# Patient Record
Sex: FEMALE | Race: BLACK OR AFRICAN AMERICAN | NOT HISPANIC OR LATINO | URBAN - NONMETROPOLITAN AREA
[De-identification: names, ages, dates, MRNs, and addresses within clinical notes are randomized per-mention and may not be internally consistent; named-entity substitution may affect disease eponyms.]

---

## 2023-12-19 ENCOUNTER — ESTABLISHED PATIENT (OUTPATIENT)
Dept: URBAN - NONMETROPOLITAN AREA CLINIC 6 | Facility: CLINIC | Age: 78
End: 2023-12-19

## 2023-12-19 DIAGNOSIS — H40.013: ICD-10-CM

## 2023-12-19 DIAGNOSIS — E11.9: ICD-10-CM

## 2023-12-19 PROCEDURE — 92014 COMPRE OPH EXAM EST PT 1/>: CPT

## 2023-12-19 PROCEDURE — 92133 CPTRZD OPH DX IMG PST SGM ON: CPT

## 2023-12-19 ASSESSMENT — KERATOMETRY
OS_AXISANGLE2_DEGREES: 86
OD_AXISANGLE2_DEGREES: 84
OS_K2POWER_DIOPTERS: 44.75
OD_K1POWER_DIOPTERS: 43.75
OS_K2POWER_DIOPTERS: 45.00
OS_K1POWER_DIOPTERS: 43.25
OD_AXISANGLE2_DEGREES: 75
OS_AXISANGLE_DEGREES: 006
OD_AXISANGLE_DEGREES: 174
OS_AXISANGLE_DEGREES: 176
OD_AXISANGLE_DEGREES: 165
OD_K2POWER_DIOPTERS: 45.25
OD_K2POWER_DIOPTERS: 45.75
OS_AXISANGLE2_DEGREES: 96

## 2023-12-19 ASSESSMENT — VISUAL ACUITY
OU_CC: 20/25+1
OS_CC: 20/20
OD_CC: 20/50

## 2023-12-19 ASSESSMENT — TONOMETRY
OS_IOP_MMHG: 13
OD_IOP_MMHG: 12

## 2024-01-24 ENCOUNTER — TECH ONLY (OUTPATIENT)
Dept: URBAN - NONMETROPOLITAN AREA CLINIC 6 | Facility: CLINIC | Age: 79
End: 2024-01-24

## 2024-01-24 DIAGNOSIS — H40.013: ICD-10-CM

## 2024-01-24 PROCEDURE — 76514 ECHO EXAM OF EYE THICKNESS: CPT

## 2024-01-24 PROCEDURE — 92250 FUNDUS PHOTOGRAPHY W/I&R: CPT

## 2024-01-24 PROCEDURE — 92083 EXTENDED VISUAL FIELD XM: CPT

## 2024-01-24 ASSESSMENT — KERATOMETRY
OD_K1POWER_DIOPTERS: 43.75
OS_AXISANGLE_DEGREES: 176
OS_K2POWER_DIOPTERS: 45.00
OS_AXISANGLE2_DEGREES: 86
OS_K2POWER_DIOPTERS: 44.75
OD_AXISANGLE2_DEGREES: 84
OS_K1POWER_DIOPTERS: 43.25
OD_AXISANGLE_DEGREES: 165
OD_K2POWER_DIOPTERS: 45.25
OS_AXISANGLE2_DEGREES: 96
OS_AXISANGLE_DEGREES: 006
OD_AXISANGLE_DEGREES: 174
OD_K2POWER_DIOPTERS: 45.75
OD_AXISANGLE2_DEGREES: 75

## 2024-01-24 ASSESSMENT — TONOMETRY
OS_IOP_MMHG: 13
OD_IOP_MMHG: 14

## 2024-12-09 ENCOUNTER — TELEPHONE ENCOUNTER (OUTPATIENT)
Dept: URBAN - METROPOLITAN AREA CLINIC 107 | Facility: CLINIC | Age: 79
End: 2024-12-09

## 2025-01-02 PROBLEM — 236069009: Status: ACTIVE | Noted: 2025-01-02

## 2025-01-02 PROBLEM — 162106006: Status: ACTIVE | Noted: 2025-01-02

## 2025-01-02 PROBLEM — 225593006: Status: ACTIVE | Noted: 2025-01-02

## 2025-01-03 ENCOUNTER — OFFICE VISIT (OUTPATIENT)
Dept: URBAN - METROPOLITAN AREA CLINIC 72 | Facility: CLINIC | Age: 80
End: 2025-01-03
Payer: MEDICARE

## 2025-01-03 ENCOUNTER — DASHBOARD ENCOUNTERS (OUTPATIENT)
Age: 80
End: 2025-01-03

## 2025-01-03 VITALS
DIASTOLIC BLOOD PRESSURE: 66 MMHG | BODY MASS INDEX: 31.19 KG/M2 | HEART RATE: 96 BPM | TEMPERATURE: 97.2 F | HEIGHT: 65 IN | SYSTOLIC BLOOD PRESSURE: 104 MMHG | WEIGHT: 187.2 LBS

## 2025-01-03 DIAGNOSIS — R19.7 OVERFLOW DIARRHEA: ICD-10-CM

## 2025-01-03 DIAGNOSIS — K59.09 CHRONIC CONSTIPATION: ICD-10-CM

## 2025-01-03 DIAGNOSIS — R15.1 FECAL SMEARING: ICD-10-CM

## 2025-01-03 PROCEDURE — 99204 OFFICE O/P NEW MOD 45 MIN: CPT

## 2025-01-03 PROCEDURE — 99203 OFFICE O/P NEW LOW 30 MIN: CPT

## 2025-01-03 RX ORDER — BENZONATATE 100 MG/1
1 CAPSULE AS NEEDED CAPSULE ORAL THREE TIMES A DAY
Status: ACTIVE | COMMUNITY

## 2025-01-03 RX ORDER — CARVEDILOL 6.25 MG/1
1 TABLET WITH FOOD TABLET, FILM COATED ORAL TWICE A DAY
Status: ACTIVE | COMMUNITY

## 2025-01-03 RX ORDER — HYDROXYZINE HYDROCHLORIDE 10 MG/1
1 TABLET AS NEEDED TABLET, FILM COATED ORAL ONCE A DAY
Status: ACTIVE | COMMUNITY

## 2025-01-03 RX ORDER — FLUTICASONE PROPIONATE 50 UG/1
1 SPRAY IN EACH NOSTRIL SPRAY, METERED NASAL TWICE A DAY
Status: ACTIVE | COMMUNITY

## 2025-01-03 RX ORDER — LEVOTHYROXINE SODIUM 137 UG/1
1 CAPSULE IN THE MORNING ON AN EMPTY STOMACH CAPSULE ORAL ONCE A DAY
Status: ACTIVE | COMMUNITY

## 2025-01-03 RX ORDER — DILTIAZEM HYDROCHLORIDE 240 MG/1
1 CAPSULE CAPSULE, EXTENDED RELEASE ORAL ONCE A DAY
Status: ACTIVE | COMMUNITY

## 2025-01-03 RX ORDER — CILOSTAZOL 100 MG/1
1 TABLET 30 MINUTES BEFORE OR 2 HOURS AFTER BREAKFAST AND DINNER TABLET ORAL TWICE A DAY
Status: ACTIVE | COMMUNITY

## 2025-01-03 RX ORDER — PAROXETINE HYDROCHLORIDE HEMIHYDRATE 10 MG/1
1 TABLET IN THE MORNING TABLET, FILM COATED ORAL ONCE A DAY
Status: ACTIVE | COMMUNITY

## 2025-01-03 RX ORDER — METHENAMINE HIPPURATE 1000 MG/1
1 TABLET TABLET ORAL ONCE DAILY
Status: ACTIVE | COMMUNITY

## 2025-01-03 RX ORDER — LOSARTAN POTASSIUM 100 MG/1
1 TABLET TABLET, FILM COATED ORAL ONCE A DAY
Status: ACTIVE | COMMUNITY

## 2025-01-03 RX ORDER — GLIPIZIDE 10 MG/1
1 TABLET 30 MINUTES BEFORE BREAKFAST TABLET ORAL ONCE A DAY
Status: ACTIVE | COMMUNITY

## 2025-01-03 RX ORDER — ATORVASTATIN CALCIUM 80 MG/1
1 TABLET TABLET, FILM COATED ORAL ONCE A DAY
Status: ACTIVE | COMMUNITY

## 2025-01-03 RX ORDER — METFORMIN HYDROCHLORIDE 750 MG/1
1 TABLET WITH EVENING MEAL TABLET, EXTENDED RELEASE ORAL ONCE A DAY
Status: ACTIVE | COMMUNITY

## 2025-01-03 RX ORDER — METOCLOPRAMIDE 10 MG/1
1 TABLET BEFORE MEALS TABLET ORAL TWICE A DAY
Status: ACTIVE | COMMUNITY

## 2025-01-03 RX ORDER — AZITHROMYCIN MONOHYDRATE 250 MG/1
AS DIRECTED TABLET, FILM COATED ORAL
Status: ACTIVE | COMMUNITY

## 2025-01-03 NOTE — HPI-TODAY'S VISIT:
Patient is a 79-year-old female referred by BRITTANIE Tomlinson for change in bowels with constipation and diarrhea with some fecal smearing.  This has been going on since June 2024. It resolved earlier this week. No longe rhaving leakage, Moving bowels almost daily. Sometimes she has to strain. It is is moderate solid consistency and sometimes it is super soft. Patient does not take anything for her bowels for regularity. No blood in the stool.   Last colonoscopy was approximately 5 years ago. It was normal.  No FH colon/GI cancer,

## 2025-02-07 ENCOUNTER — OFFICE VISIT (OUTPATIENT)
Dept: URBAN - METROPOLITAN AREA CLINIC 72 | Facility: CLINIC | Age: 80
End: 2025-02-07
Payer: MEDICARE

## 2025-02-07 VITALS
SYSTOLIC BLOOD PRESSURE: 117 MMHG | DIASTOLIC BLOOD PRESSURE: 54 MMHG | HEIGHT: 65 IN | TEMPERATURE: 97 F | WEIGHT: 185.4 LBS | BODY MASS INDEX: 30.89 KG/M2 | HEART RATE: 78 BPM

## 2025-02-07 DIAGNOSIS — R15.1 FECAL SMEARING: ICD-10-CM

## 2025-02-07 DIAGNOSIS — R19.7 OVERFLOW DIARRHEA: ICD-10-CM

## 2025-02-07 DIAGNOSIS — K59.09 CHRONIC CONSTIPATION: ICD-10-CM

## 2025-02-07 PROCEDURE — 99214 OFFICE O/P EST MOD 30 MIN: CPT

## 2025-02-07 PROCEDURE — 99213 OFFICE O/P EST LOW 20 MIN: CPT

## 2025-02-07 RX ORDER — HYDROXYZINE HYDROCHLORIDE 10 MG/1
1 TABLET AS NEEDED TABLET, FILM COATED ORAL ONCE A DAY
Status: ACTIVE | COMMUNITY

## 2025-02-07 RX ORDER — LOSARTAN POTASSIUM 100 MG/1
1 TABLET TABLET, FILM COATED ORAL ONCE A DAY
Status: ACTIVE | COMMUNITY

## 2025-02-07 RX ORDER — GLIPIZIDE 10 MG/1
1 TABLET 30 MINUTES BEFORE BREAKFAST TABLET ORAL ONCE A DAY
Status: ACTIVE | COMMUNITY

## 2025-02-07 RX ORDER — ATORVASTATIN CALCIUM 80 MG/1
1 TABLET TABLET, FILM COATED ORAL ONCE A DAY
Status: ACTIVE | COMMUNITY

## 2025-02-07 RX ORDER — LEVOTHYROXINE SODIUM 137 UG/1
1 CAPSULE IN THE MORNING ON AN EMPTY STOMACH CAPSULE ORAL ONCE A DAY
Status: ACTIVE | COMMUNITY

## 2025-02-07 RX ORDER — CILOSTAZOL 100 MG/1
1 TABLET 30 MINUTES BEFORE OR 2 HOURS AFTER BREAKFAST AND DINNER TABLET ORAL TWICE A DAY
Status: ACTIVE | COMMUNITY

## 2025-02-07 RX ORDER — DILTIAZEM HYDROCHLORIDE 240 MG/1
1 CAPSULE CAPSULE, EXTENDED RELEASE ORAL ONCE A DAY
Status: ACTIVE | COMMUNITY

## 2025-02-07 RX ORDER — METFORMIN HYDROCHLORIDE 750 MG/1
1 TABLET WITH EVENING MEAL TABLET, EXTENDED RELEASE ORAL ONCE A DAY
Status: ON HOLD | COMMUNITY

## 2025-02-07 RX ORDER — BENZONATATE 100 MG/1
1 CAPSULE AS NEEDED CAPSULE ORAL THREE TIMES A DAY
Status: ACTIVE | COMMUNITY

## 2025-02-07 RX ORDER — FLUTICASONE PROPIONATE 50 UG/1
1 SPRAY IN EACH NOSTRIL SPRAY, METERED NASAL TWICE A DAY
Status: ACTIVE | COMMUNITY

## 2025-02-07 RX ORDER — METHENAMINE HIPPURATE 1000 MG/1
1 TABLET TABLET ORAL ONCE DAILY
Status: ACTIVE | COMMUNITY

## 2025-02-07 RX ORDER — METOCLOPRAMIDE 10 MG/1
1 TABLET BEFORE MEALS TABLET ORAL TWICE A DAY
Status: ACTIVE | COMMUNITY

## 2025-02-07 RX ORDER — CARVEDILOL 6.25 MG/1
1 TABLET WITH FOOD TABLET, FILM COATED ORAL TWICE A DAY
Status: ACTIVE | COMMUNITY

## 2025-02-07 RX ORDER — PAROXETINE HYDROCHLORIDE HEMIHYDRATE 10 MG/1
1 TABLET IN THE MORNING TABLET, FILM COATED ORAL ONCE A DAY
Status: ACTIVE | COMMUNITY

## 2025-02-07 RX ORDER — AZITHROMYCIN MONOHYDRATE 250 MG/1
AS DIRECTED TABLET, FILM COATED ORAL
Status: ON HOLD | COMMUNITY

## 2025-02-07 RX ORDER — METFORMIN HYDROCHLORIDE 1000 MG/1
1 TABLET WITH A MEAL TABLET, FILM COATED ORAL ONCE A DAY
Status: ACTIVE | COMMUNITY

## 2025-02-07 NOTE — HPI-TODAY'S VISIT:
Patient is a 79-year-old female last seen in the office on 1/3/2025 for chronic constipation with overflow diarrhea and fecal smearing.  Patient was asked to start fiber and MiraLAX daily and is here for her 4-week reassessment.  Patient is seen today and started the fiber gummies daily - she is taking two daily. She is still having leakage. She wakes up a mess. The bowels are mostly liquid. Sometimes she will have a little form. She feels full at times. Sometimes she will have incontinence during the day - she doesnt feel it, but mostly it is at night - she will mess her pajamas.

## 2025-03-14 ENCOUNTER — OFFICE VISIT (OUTPATIENT)
Dept: URBAN - METROPOLITAN AREA CLINIC 72 | Facility: CLINIC | Age: 80
End: 2025-03-14
Payer: MEDICARE

## 2025-03-14 ENCOUNTER — LAB OUTSIDE AN ENCOUNTER (OUTPATIENT)
Dept: URBAN - METROPOLITAN AREA CLINIC 72 | Facility: CLINIC | Age: 80
End: 2025-03-14

## 2025-03-14 VITALS
HEART RATE: 88 BPM | HEIGHT: 65 IN | DIASTOLIC BLOOD PRESSURE: 60 MMHG | TEMPERATURE: 97.4 F | SYSTOLIC BLOOD PRESSURE: 116 MMHG | WEIGHT: 186.4 LBS | BODY MASS INDEX: 31.06 KG/M2

## 2025-03-14 DIAGNOSIS — R15.1 FECAL SMEARING: ICD-10-CM

## 2025-03-14 DIAGNOSIS — R19.7 OVERFLOW DIARRHEA: ICD-10-CM

## 2025-03-14 DIAGNOSIS — K59.09 CHRONIC CONSTIPATION: ICD-10-CM

## 2025-03-14 PROCEDURE — 99213 OFFICE O/P EST LOW 20 MIN: CPT

## 2025-03-14 RX ORDER — LEVOTHYROXINE SODIUM 137 UG/1
1 CAPSULE IN THE MORNING ON AN EMPTY STOMACH CAPSULE ORAL ONCE A DAY
Status: ACTIVE | COMMUNITY

## 2025-03-14 RX ORDER — CARVEDILOL 6.25 MG/1
1 TABLET WITH FOOD TABLET, FILM COATED ORAL TWICE A DAY
Status: ACTIVE | COMMUNITY

## 2025-03-14 RX ORDER — LOSARTAN POTASSIUM 100 MG/1
1 TABLET TABLET, FILM COATED ORAL ONCE A DAY
Status: ACTIVE | COMMUNITY

## 2025-03-14 RX ORDER — DILTIAZEM HYDROCHLORIDE 240 MG/1
1 CAPSULE CAPSULE, EXTENDED RELEASE ORAL ONCE A DAY
Status: ACTIVE | COMMUNITY

## 2025-03-14 RX ORDER — BENZONATATE 100 MG/1
1 CAPSULE AS NEEDED CAPSULE ORAL THREE TIMES A DAY
Status: ACTIVE | COMMUNITY

## 2025-03-14 RX ORDER — CILOSTAZOL 100 MG/1
1 TABLET 30 MINUTES BEFORE OR 2 HOURS AFTER BREAKFAST AND DINNER TABLET ORAL TWICE A DAY
Status: ACTIVE | COMMUNITY

## 2025-03-14 RX ORDER — METOCLOPRAMIDE 10 MG/1
1 TABLET BEFORE MEALS TABLET ORAL TWICE A DAY
Status: ACTIVE | COMMUNITY

## 2025-03-14 RX ORDER — METHENAMINE HIPPURATE 1000 MG/1
1 TABLET TABLET ORAL ONCE DAILY
Status: ACTIVE | COMMUNITY

## 2025-03-14 RX ORDER — METFORMIN HYDROCHLORIDE 1000 MG/1
1 TABLET WITH A MEAL TABLET, FILM COATED ORAL ONCE A DAY
Status: ACTIVE | COMMUNITY

## 2025-03-14 RX ORDER — GLIPIZIDE 10 MG/1
1 TABLET 30 MINUTES BEFORE BREAKFAST TABLET ORAL ONCE A DAY
Status: ACTIVE | COMMUNITY

## 2025-03-14 RX ORDER — AZITHROMYCIN MONOHYDRATE 250 MG/1
AS DIRECTED TABLET, FILM COATED ORAL
Status: ON HOLD | COMMUNITY

## 2025-03-14 RX ORDER — FLUTICASONE PROPIONATE 50 UG/1
1 SPRAY IN EACH NOSTRIL SPRAY, METERED NASAL TWICE A DAY
Status: ACTIVE | COMMUNITY

## 2025-03-14 RX ORDER — HYDROXYZINE HYDROCHLORIDE 10 MG/1
1 TABLET AS NEEDED TABLET, FILM COATED ORAL ONCE A DAY
Status: ACTIVE | COMMUNITY

## 2025-03-14 RX ORDER — PAROXETINE HYDROCHLORIDE HEMIHYDRATE 10 MG/1
1 TABLET IN THE MORNING TABLET, FILM COATED ORAL ONCE A DAY
Status: ACTIVE | COMMUNITY

## 2025-03-14 RX ORDER — ATORVASTATIN CALCIUM 80 MG/1
1 TABLET TABLET, FILM COATED ORAL ONCE A DAY
Status: ACTIVE | COMMUNITY

## 2025-03-14 RX ORDER — METFORMIN HYDROCHLORIDE 750 MG/1
1 TABLET WITH EVENING MEAL TABLET, EXTENDED RELEASE ORAL ONCE A DAY
Status: ON HOLD | COMMUNITY

## 2025-03-14 NOTE — HPI-TODAY'S VISIT:
Patient is a 79-year-old female last seen in the office on 2/7/2025 for constipation with overflow diarrhea.  Patient was originally started on fiber daily, but was still having incontinent episodes at night.  She was asked to add a stimulant laxative every 3 days.  This is patient's 4-week reassessment.  Patient is seen today and states that she is still having incontinent episodes. Patient is taking fiber and stool softener daily and is using senekot every 3 days. She is worried about the leakage - it happens at night and she cant even feel it happening. She states that her stools are very soft - she has not really noticed a chnage in her bowels.   Will do a colonoscopy.

## 2025-05-06 ENCOUNTER — COMPREHENSIVE EXAM (OUTPATIENT)
Age: 80
End: 2025-05-06

## 2025-05-06 DIAGNOSIS — H40.013: ICD-10-CM

## 2025-05-06 DIAGNOSIS — E11.9: ICD-10-CM

## 2025-05-06 PROCEDURE — 92133 CPTRZD OPH DX IMG PST SGM ON: CPT

## 2025-05-06 PROCEDURE — 92014 COMPRE OPH EXAM EST PT 1/>: CPT

## 2025-05-06 PROCEDURE — 92134 CPTRZ OPH DX IMG PST SGM RTA: CPT | Mod: NC

## 2025-05-06 PROCEDURE — 92015 DETERMINE REFRACTIVE STATE: CPT

## 2025-05-09 ENCOUNTER — OFFICE VISIT (OUTPATIENT)
Dept: URBAN - METROPOLITAN AREA MEDICAL CENTER 40 | Facility: MEDICAL CENTER | Age: 80
End: 2025-05-09

## 2025-05-30 ENCOUNTER — OFFICE VISIT (OUTPATIENT)
Dept: URBAN - METROPOLITAN AREA CLINIC 72 | Facility: CLINIC | Age: 80
End: 2025-05-30

## 2025-06-13 ENCOUNTER — TELEPHONE ENCOUNTER (OUTPATIENT)
Dept: URBAN - METROPOLITAN AREA CLINIC 72 | Facility: CLINIC | Age: 80
End: 2025-06-13